# Patient Record
Sex: FEMALE | HISPANIC OR LATINO | URBAN - NONMETROPOLITAN AREA
[De-identification: names, ages, dates, MRNs, and addresses within clinical notes are randomized per-mention and may not be internally consistent; named-entity substitution may affect disease eponyms.]

---

## 2020-06-08 ENCOUNTER — LAB REQUISITION (OUTPATIENT)
Dept: LAB | Facility: HOSPITAL | Age: 30
End: 2020-06-08

## 2020-06-08 DIAGNOSIS — R05.9 COUGH: ICD-10-CM

## 2020-06-08 PROCEDURE — U0002 COVID-19 LAB TEST NON-CDC: HCPCS | Performed by: INTERNAL MEDICINE

## 2020-06-09 LAB
REF LAB TEST METHOD: NORMAL
SARS-COV-2 RNA RESP QL NAA+PROBE: NOT DETECTED

## 2025-01-29 ENCOUNTER — TELEPHONE (OUTPATIENT)
Dept: OBSTETRICS AND GYNECOLOGY | Facility: CLINIC | Age: 35
End: 2025-01-29

## 2025-01-29 NOTE — TELEPHONE ENCOUNTER
This patient requires an  for their appointment. Please see the  information below.     Caller: BRAYDON CHASE  Relationship to patient: SELF  Best call back number: [phone] (home)    Service:IPAD  Is  needs updated in registration: yes  Date of Appointment:02-28  Time of Appointment:9 AM  Additional notes:[place]

## 2025-02-28 ENCOUNTER — OFFICE VISIT (OUTPATIENT)
Dept: OBSTETRICS AND GYNECOLOGY | Facility: CLINIC | Age: 35
End: 2025-02-28

## 2025-02-28 VITALS
DIASTOLIC BLOOD PRESSURE: 60 MMHG | SYSTOLIC BLOOD PRESSURE: 98 MMHG | WEIGHT: 122.4 LBS | BODY MASS INDEX: 24.03 KG/M2 | HEIGHT: 60 IN

## 2025-02-28 DIAGNOSIS — Z30.09 ENCOUNTER FOR OTHER GENERAL COUNSELING OR ADVICE ON CONTRACEPTION: Primary | ICD-10-CM

## 2025-02-28 NOTE — PROGRESS NOTES
"Subjective   Chief Complaint   Patient presents with    Contraception     New Patient wanting to discuss IUD removal and reinsertion     Lyly Ramon is a 34 y.o. year old .  No LMP recorded. Patient has had an implant.  She presents to be seen because of ***.     OTHER COMPLAINTS:  {Other complaints:02602::\"Nothing else\"}    The following portions of the patient's history were reviewed and updated as appropriate:She  has a past medical history of Anemia.  She does not have a problem list on file.  She  has a past surgical history that includes  section.  Her family history includes Diabetes in her sister.  She  reports that she has never smoked. She has never used smokeless tobacco. She reports that she does not drink alcohol and does not use drugs.  No current outpatient medications on file.     No current facility-administered medications for this visit.     Current Outpatient Medications on File Prior to Visit   Medication Sig    [DISCONTINUED] acetaminophen (TYLENOL) 325 MG suppository Insert 1 suppository into the rectum.     No current facility-administered medications on file prior to visit.     She has No Known Allergies.    Social History    Tobacco Use      Smoking status: Never      Smokeless tobacco: Never    Review of Systems  Consitutional POS: {Constitutional (+) ROS:48183::\"nothing reported\"}    NEG: {Constitutional (-) ROS:67729::\"anorexia\",\"night sweats\"}   Gastointestinal POS: {GI (+) ROS:32282::\"nothing reported\"}    NEG: {GI (-) ROS:57367::\"bloating\",\"change in bowel habits\",\"melena\",\"reflux symptoms\"}   Genitourinary POS: { (+) ROS:86217::\"nothing reported\"}    NEG: { (-) ROS:04326::\"dysuria\",\"hematuria\"}   Integument POS: {Skin (+) ROS:43033::\"nothing reported\"}    NEG: {Skin (-) ROS:96799::\"moles that are changing in size, shape, color\",\"rashes\"}   Breast POS: {Breast (+) ROS:96635::\"nothing reported\"}    NEG: {Breast (-) ROS:57450::\"persistent breast lump\",\"skin " "dimpling\",\"nipple discharge\"}         Respiratory: {Findings; ROS respiratory:03750::\"negative\"}  Cardiovascular: {Findings; ROS cardiac:59529::\"negative\"}  GYN:  {FINDINGS; ROS GYN:5723142478::\"negative\"}          Objective   BP 98/60   Ht 152.4 cm (60\")   Wt 55.5 kg (122 lb 6.4 oz)   BMI 23.90 kg/m²     General:  {Exam - general findings:42144::\"well developed; well nourished\",\"no acute distress\",\"mentation appropriate\"}   Skin:  {Exam - skin:12170::\"No suspicious lesions seen\"}   Thyroid: {Exam - thyroid:49690::\"normal to inspection and palpation\"}   Lungs:  {Exam - lung(s):14986::\"breathing is unlabored\"}   Heart:  {Exam - cardiac:90862}   Breasts:  {Exam - breasts:38147::\"Examined in supine position\",\"Symmetric without masses or skin dimpling\",\"Nipples normal without inversion, lesions or discharge\",\"There are no palpable axillary nodes\"}   Abdomen: {Exam - abdomen:23205::\"soft, non-tender; no masses\",\"no umbilical or inguinal hernias are present\",\"no hepato-splenomegaly\"}   Pelvis: {Exam; GYN pelvic:39923::\"Clinical staff was present for exam\"}     Psychiatric: Alert and oriented ×3, mood and affect appropriate  HEENT: Atraumatic, normocephalic, normal scleral icterus  Extremities: 2+ pulses bilaterally, no edema      Lab Review   {Review - gyn labs:10381::\"No data reviewed\"}    Imaging   {Review - imagin::\"No data reviewed\"}        Assessment   ***     Plan   ***      No orders of the defined types were placed in this encounter.         This note was electronically signed.      2025      "

## 2025-02-28 NOTE — PROGRESS NOTES
"Subjective   Chief Complaint   Patient presents with    Contraception     New Patient wanting to discuss IUD removal and reinsertion     Lyly Ramon is a 34 y.o. year old  (C/S x 3).  No LMP recorded. Patient has had an implant.  She presents to be seen because of interception.  Kyleena placed 2019..     OTHER COMPLAINTS:  Nothing else    The following portions of the patient's history were reviewed and updated as appropriate:She  has a past medical history of Anemia.  She does not have a problem list on file.  She  has a past surgical history that includes  section.  Her family history includes Diabetes in her sister.  She  reports that she has never smoked. She has never used smokeless tobacco. She reports that she does not drink alcohol and does not use drugs.  No current outpatient medications on file.     No current facility-administered medications for this visit.     Current Outpatient Medications on File Prior to Visit   Medication Sig    [DISCONTINUED] acetaminophen (TYLENOL) 325 MG suppository Insert 1 suppository into the rectum.     No current facility-administered medications on file prior to visit.     She has No Known Allergies.    Social History    Tobacco Use      Smoking status: Never      Smokeless tobacco: Never    Review of Systems  Consitutional POS: nothing reported    NEG: anorexia or night sweats   Gastointestinal POS: nothing reported    NEG: bloating, change in bowel habits, melena, or reflux symptoms   Genitourinary POS: nothing reported    NEG: dysuria or hematuria   Integument POS: nothing reported    NEG: moles that are changing in size, shape, color or rashes   Breast POS: nothing reported    NEG: persistent breast lump, skin dimpling, or nipple discharge         Respiratory: negative  Cardiovascular: negative  GYN:  negative          Objective   BP 98/60   Ht 152.4 cm (60\")   Wt 55.5 kg (122 lb 6.4 oz)   BMI 23.90 kg/m²     General:  well developed; well " nourished  no acute distress  mentation appropriate   Skin:  No suspicious lesions seen   Thyroid: not examined   Lungs:  breathing is unlabored   Heart:  Not performed.   Breasts:  Not performed.   Abdomen: soft, non-tender; no masses  no umbilical or inguinal hernias are present  no hepato-splenomegaly   Pelvis: Not performed.     Psychiatric: Alert and oriented ×3, mood and affect appropriate  HEENT: Atraumatic, normocephalic, normal scleral icterus  Extremities: 2+ pulses bilaterally, no edema      Lab Review   No data reviewed    Imaging   No data reviewed        Assessment   Kyleena: Patient had Mirena in the past and prefers this.  She has regular menses with this Kyleena.     Plan   Pap report from [facility]. Set up Kyleena removal and Mirena insertion       No orders of the defined types were placed in this encounter.         This note was electronically signed.      February 28, 2025

## 2025-05-06 ENCOUNTER — OFFICE VISIT (OUTPATIENT)
Dept: OBSTETRICS AND GYNECOLOGY | Facility: CLINIC | Age: 35
End: 2025-05-06

## 2025-05-06 VITALS
BODY MASS INDEX: 24.42 KG/M2 | SYSTOLIC BLOOD PRESSURE: 100 MMHG | WEIGHT: 124.4 LBS | HEIGHT: 60 IN | DIASTOLIC BLOOD PRESSURE: 64 MMHG

## 2025-05-06 DIAGNOSIS — Z30.433 ENCOUNTER FOR IUD REMOVAL AND REINSERTION: Primary | ICD-10-CM

## 2025-05-06 NOTE — PROGRESS NOTES
..IUD Removal and Immediate Reinsertion    No LMP recorded. Patient has had an implant.    Date of procedure:  5/6/2025    Risks and benefits discussed? yes  All questions answered? yes  Consents given by the patient  Written consent obtained? yes  Reason for removal: Device expiration    Local anesthesia used:  no    Procedure documentation:    A speculum was placed in order to view the cervix.  A tenaculum did not need to be placed on the anterior cervical lip.  Cervical dilation did not need to be performed in order to access the string.  The IUD string was easily seen.  The string was grasped and the IUD was removed without difficulty.  The IUD did not appear to be adherent to the uterine cavity. It was removed intact.    A sterile speculum was replaced and the cervix was cleansed with an antiseptic solution.  Vaginal discharge was scant.  The anterior lip of the cervix was grasped with a tenaculum and the uterine cavity was gently sounded.  There was mild difficulty passing the sound through the cervix.  Cervical dilation did not need to be performed prior to placing the IUD.  The uterus was anteverted and sounded to 8 cms.  The Mirena was then prepared per the manufacturers instructions.    The Mirena was advanced to a point 2 cms from the fundus and then the arms were released from the sheath.  The device was advanced to the fundus and the device was released fully from the sheath.. The string was cut 2 cms in length.  Bleeding from the cervix was scant.    She tolerated the procedure without any difficulty.     Post procedure instructions: It was reviewed that the Mirena will not alter the timing of when she bleeds but it may decrease the quantity of flow and cramps.  Roughly 30% of people will be amenorrheic over time.  Efficacy rate of 99.2% over 8 years was discussed.  Spontaneous expulsion rate of 1-2% was also discussed.  If she has any issue with fever or excessive bleeding or pain she is to call the  office immediately.  Otherwise I would like to see her back in 6 weeks with an ultrasound to confirm correct placement.    This note was electronically signed.    Fuentes Goldsmith MD

## 2025-06-11 ENCOUNTER — OFFICE VISIT (OUTPATIENT)
Dept: OBSTETRICS AND GYNECOLOGY | Facility: CLINIC | Age: 35
End: 2025-06-11

## 2025-06-11 VITALS
SYSTOLIC BLOOD PRESSURE: 100 MMHG | WEIGHT: 124 LBS | DIASTOLIC BLOOD PRESSURE: 58 MMHG | BODY MASS INDEX: 24.35 KG/M2 | HEIGHT: 60 IN

## 2025-06-11 DIAGNOSIS — Z30.431 IUD CHECK UP: Primary | ICD-10-CM

## 2025-06-11 PROCEDURE — 99212 OFFICE O/P EST SF 10 MIN: CPT | Performed by: OBSTETRICS & GYNECOLOGY

## 2025-06-11 NOTE — PROGRESS NOTES
Subjective   Chief Complaint   Patient presents with    Follow-up     IUD insertion follow up . TVS done today.     Lyly Ramon is a 34 y.o. year old .  No LMP recorded. Patient has had an implant.  She presents to be seen because of IUD follow-up.  Mirena placed approximate 2 months ago.  Patient overall doing well without complaints..     OTHER COMPLAINTS:  Nothing else    The following portions of the patient's history were reviewed and updated as appropriate:She  has a past medical history of Anemia.  She does not have a problem list on file.  She  has a past surgical history that includes  section.  Her family history includes Diabetes in her sister.  She  reports that she has never smoked. She has never used smokeless tobacco. She reports that she does not drink alcohol and does not use drugs.  Current Outpatient Medications   Medication Sig Dispense Refill    Levonorgestrel (MIRENA) 20 MCG/DAY intrauterine device IUD To be inserted one time by prescriber. Route intrauterine. 1 each 0     No current facility-administered medications for this visit.     Current Outpatient Medications on File Prior to Visit   Medication Sig    Levonorgestrel (MIRENA) 20 MCG/DAY intrauterine device IUD To be inserted one time by prescriber. Route intrauterine.     No current facility-administered medications on file prior to visit.     She has no known allergies.    Social History    Tobacco Use      Smoking status: Never      Smokeless tobacco: Never    Review of Systems  Consitutional POS: nothing reported    NEG: anorexia or night sweats   Gastointestinal POS: nothing reported    NEG: bloating, change in bowel habits, melena, or reflux symptoms   Genitourinary POS: nothing reported    NEG: dysuria or hematuria   Integument POS: nothing reported    NEG: moles that are changing in size, shape, color or rashes   Breast POS: nothing reported    NEG: persistent breast lump, skin dimpling, or nipple  "discharge         Respiratory: negative  Cardiovascular: negative  GYN:  Negative          Objective   /58   Ht 152.4 cm (60\")   Wt 56.2 kg (124 lb)   BMI 24.22 kg/m²     General:  well developed; well nourished  no acute distress  mentation appropriate   Skin:  Not performed.   Thyroid: not examined   Lungs:  breathing is unlabored   Heart:  Not performed.   Breasts:  Not performed.   Abdomen: Not performed.   Pelvis: Not performed.     Psychiatric: Alert and oriented ×3, mood and affect appropriate  HEENT: Atraumatic, normocephalic, normal scleral icterus  Extremities: 2+ pulses bilaterally, no edema      Lab Review   No data reviewed    Imaging   Anteverted uterus normal in size and shape.  IUD in proper position.  Normal adnexa.  No masses.  No free fluid       Assessment   IUD follow-up.  Patient overall doing well.  IUD in proper position.  Discussed cessation of menses.  Discussed 8-year timeframe for its usage.  Follow-up as needed or with any concerns     Plan   As above      No orders of the defined types were placed in this encounter.         This note was electronically signed.      June 11, 2025      "